# Patient Record
Sex: MALE | Race: WHITE | NOT HISPANIC OR LATINO | Employment: UNEMPLOYED | URBAN - METROPOLITAN AREA
[De-identification: names, ages, dates, MRNs, and addresses within clinical notes are randomized per-mention and may not be internally consistent; named-entity substitution may affect disease eponyms.]

---

## 2019-10-25 ENCOUNTER — OFFICE VISIT CONVERTED (OUTPATIENT)
Dept: CARDIOLOGY | Facility: CLINIC | Age: 44
End: 2019-10-25
Attending: SPECIALIST

## 2020-07-13 ENCOUNTER — HOSPITAL ENCOUNTER (OUTPATIENT)
Dept: GENERAL RADIOLOGY | Facility: HOSPITAL | Age: 45
Discharge: HOME | End: 2020-07-13
Attending: INTERNAL MEDICINE

## 2020-07-31 ENCOUNTER — OFFICE VISIT CONVERTED (OUTPATIENT)
Dept: CARDIOLOGY | Facility: CLINIC | Age: 45
End: 2020-07-31
Attending: SPECIALIST

## 2021-05-04 ENCOUNTER — HOSPITAL ENCOUNTER (OUTPATIENT)
Dept: MRI IMAGING | Facility: HOSPITAL | Age: 46
Discharge: HOME | End: 2021-05-04
Attending: PSYCHIATRY & NEUROLOGY

## 2021-05-13 NOTE — PROGRESS NOTES
"   Progress Note      Patient Name: Abraham Sheridan   Patient ID: [id number]   Sex: Male   YOB: 1975    Primary Care Provider: Trudy Hughes MD   Referring Provider: Trudy Hughes MD    Visit Date: July 31, 2020    Provider: David Fernández MD   Location: [facility]   Location Address: [address] [place] [address], [facility]   [place], [place]  [id number]   Location Phone: [phone]          Chief Complaint     Coronary artery disease.       History Of Present Illness  Abraham Sheridan is a 45 year old /White male with a history of coronary artery disease status post PTCA/stent. No further chest pain. No PND.   CURRENT MEDICATIONS: Aspirin 81 mg daily; atorvastatin 20 mg daily; Plavix 75 mg daily; sertraline 100 mg daily.   PAST MEDICAL HISTORY: COPD; Coronary artery disease status post PTCA/stent; Hyperlipidemia; Hypertension; Obstructive sleep apnea; Polycythemia.   FAMILY HISTORY: Positive for diabetes mellitus, hypertension, and heart disease.   PSYCHOSOCIAL HISTORY: Previously smoked, but quit. Rarely consumes alcohol.       Review of Systems  · Cardiovascular  o Admits  o : palpitations (fast, fluttering, or skipping beats), shortness of breath while walking or lying flat, chest pain or angina pectoris   o Denies  o : swelling (feet, ankles, hands)  · Respiratory  o Admits  o : chronic or frequent cough, asthma or wheezing      Vitals  Date Time BP Position Site L\R Cuff Size HR RR TEMP (F) WT  HT  BMI kg/m2 BSA m2 O2 Sat        07/31/2020 10:01 /89 Sitting    70 - R   167lbs 4oz 5'  3\" 29.63 1.84           Physical Examination  · Constitutional  o Appearance  o : Awake, alert, cooperative, pleasant.  · Respiratory  o Inspection of Chest  o : No chest wall deformities, moving equal.  o Auscultation of Lungs  o : Good air entry with vesicular breath sounds.  · Cardiovascular  o Heart  o :   § Auscultation of Heart  § : S1 and S2 regular. No S3. No S4.   o Peripheral Vascular " System  o :   § Extremities  § : Peripheral pulses were well felt. No edema. No cyanosis.  · Gastrointestinal  o Abdominal Examination  o : No masses or organomegaly noted.          Assessment     ASSESSMENT & PLAN:    1.  Coronary artery disease status post PTCA/stent, stable.  Continue current dose of aspirin and Plavix.         Recent sestamibi stress test was negative for ischemia.    2.  Hyperlipidemia.  Continue Lipitor, managed by PMD.  3.  See me back in 6 months.      David Fernández MD, FACC  JULIANE:vm             Electronically Signed by: Nicolasa Robbins-, Other -Author on August 3, 2020 11:52:18 AM  Electronically Co-signed by: David Fernández MD -Reviewer on August 15, 2020 09:34:16 AM

## 2021-05-15 VITALS
SYSTOLIC BLOOD PRESSURE: 128 MMHG | HEIGHT: 63 IN | DIASTOLIC BLOOD PRESSURE: 89 MMHG | HEART RATE: 70 BPM | BODY MASS INDEX: 29.63 KG/M2 | WEIGHT: 167.25 LBS

## 2021-05-15 VITALS
HEART RATE: 80 BPM | BODY MASS INDEX: 30.48 KG/M2 | WEIGHT: 172 LBS | SYSTOLIC BLOOD PRESSURE: 134 MMHG | DIASTOLIC BLOOD PRESSURE: 90 MMHG | HEIGHT: 63 IN

## 2021-05-26 ENCOUNTER — HOSPITAL ENCOUNTER (OUTPATIENT)
Dept: NEUROLOGY | Facility: HOSPITAL | Age: 46
Discharge: HOME | End: 2021-05-26
Attending: PSYCHIATRY & NEUROLOGY

## 2021-10-12 ENCOUNTER — HOSPITAL ENCOUNTER (OUTPATIENT)
Dept: GENERAL RADIOLOGY | Facility: HOSPITAL | Age: 46
Discharge: HOME | End: 2021-10-12
Admitting: INTERNAL MEDICINE

## 2021-10-12 ENCOUNTER — TRANSCRIBE ORDERS (OUTPATIENT)
Dept: GENERAL RADIOLOGY | Facility: HOSPITAL | Age: 46
End: 2021-10-12

## 2021-10-12 DIAGNOSIS — J44.9 CHRONIC OBSTRUCTIVE PULMONARY DISEASE, UNSPECIFIED COPD TYPE (HCC): Primary | ICD-10-CM

## 2021-10-12 DIAGNOSIS — J44.9 OBSTRUCTIVE CHRONIC BRONCHITIS WITHOUT EXACERBATION (HCC): ICD-10-CM

## 2021-10-12 DIAGNOSIS — J44.9 CHRONIC OBSTRUCTIVE PULMONARY DISEASE, UNSPECIFIED COPD TYPE (HCC): ICD-10-CM

## 2021-10-12 PROCEDURE — 71046 X-RAY EXAM CHEST 2 VIEWS: CPT

## 2021-12-12 PROBLEM — E78.2 HYPERLIPEMIA, MIXED: Status: ACTIVE | Noted: 2021-12-12

## 2021-12-12 PROBLEM — I25.10 CORONARY ARTERY DISEASE INVOLVING NATIVE CORONARY ARTERY OF NATIVE HEART WITHOUT ANGINA PECTORIS: Status: ACTIVE | Noted: 2021-12-12

## 2021-12-12 PROBLEM — Z95.5 HISTORY OF CORONARY ANGIOPLASTY WITH INSERTION OF STENT: Status: ACTIVE | Noted: 2021-12-12

## 2021-12-12 NOTE — PROGRESS NOTES
[facility]  Cardiology progress Note    Patient Name: Abraham Sheridan  : 1975    CHIEF COMPLAINT  Coronary artery disease.      Subjective   Subjective     HISTORY OF PRESENT ILLNESS    Abraham Sheridan is a 46 y.o. male with history of coronary disease s/p PTCA/stent.  No chest pain or shortness of breath.    Review of Systems:   Constitutional no fever,  no weight loss   Skin no rash   Otolaryngeal no difficulty swallowing   Cardiovascular See HPI   Pulmonary no cough, no sputum production   Gastrointestinal no constipation, no diarrhea   Genitourinary no dysuria, no hematuria   Hematologic no easy bruisability, no abnormal bleeding   Musculoskeletal no muscle pain   Neurologic no dizziness, no falls         Personal History     Social History:  reports that he has quit smoking. He has never used smokeless tobacco. He reports previous alcohol use. Drug use questions deferred to the physician.    Home Medications:  Current Outpatient Medications on File Prior to Visit   Medication Sig   • ALBUTEROL IN Inhale.   • aspirin 81 MG EC tablet Take 81 mg by mouth Daily.   • atorvastatin (LIPITOR) 20 MG tablet Take 20 mg by mouth Daily.   • clonazePAM (KlonoPIN) 1 MG tablet Take 1 mg by mouth 2 (Two) Times a Day As Needed.   • clopidogrel (PLAVIX) 75 MG tablet Take 75 mg by mouth Daily.   • donepezil (ARICEPT) 10 MG tablet Take 10 mg by mouth 2 (Two) Times a Day.   • sertraline (ZOLOFT) 100 MG tablet Take 100 mg by mouth Daily.   • topiramate (TOPAMAX) 50 MG tablet Take 50 mg by mouth 2 (Two) Times a Day.     No current facility-administered medications on file prior to visit.     Allergies:  No Known Allergies    Objective    Objective       Vitals:   Heart Rate:  [72] 72  BP: (126)/(88) 126/88  Body mass index is 30.82 kg/m².     Physical Exam:   Constitutional: Awake, alert, No acute distress    Eyes: PERRLA, sclerae anicteric, no conjunctival injection   HENT: NCAT, mucous membranes moist   Neck: Supple, no  thyromegaly, no lymphadenopathy, trachea midline   Respiratory: Clear to auscultation bilaterally, nonlabored respirations    Cardiovascular: RRR, no murmurs or rubs. Palpable pedal pulses bilaterally   Musculoskeletal: No bilateral ankle edema, no cyanosis to extremities   Psychiatric: Appropriate affect, cooperative   Neurologic: Oriented x 3, strength symmetric in all extremities, Cranial Nerves grossly intact to confrontation, speech clear   Skin: No rashes.    Result Review    Result Review:  I have personally reviewed the available results from  [x]  Laboratory  [x]  EKG  [x]  Cardiology  [x]  Medications  [x]  Old records  []  Other:   Procedures  No results found for: CHOL  Lab Results   Component Value Date    TRIG 112 04/29/2020     Lab Results   Component Value Date    HDL 27 (L) 04/29/2020     Lab Results   Component Value Date     (H) 04/29/2020     Lab Results   Component Value Date    VLDL 22 04/29/2020        Impression/Plan:  1.  Coronary artery s/p PTCA stent stable: Continue aspirin 81 mg once a day.  Continue Plavix 75 mg once a day.  2.  Hyperlipidemia: Increase Lipitor 40 mg a day.  Lipid profile reviewed.  Check lipid and hepatic profile in 3 months with  3.  Obstructive sleep apnea stable: Shortness of breath stable.           David Fernández MD   12/14/21   12:03 EST

## 2021-12-14 ENCOUNTER — OFFICE VISIT (OUTPATIENT)
Dept: CARDIOLOGY | Facility: CLINIC | Age: 46
End: 2021-12-14

## 2021-12-14 VITALS
BODY MASS INDEX: 30.83 KG/M2 | HEART RATE: 72 BPM | SYSTOLIC BLOOD PRESSURE: 126 MMHG | HEIGHT: 63 IN | DIASTOLIC BLOOD PRESSURE: 88 MMHG | WEIGHT: 174 LBS

## 2021-12-14 DIAGNOSIS — I25.10 CORONARY ARTERY DISEASE INVOLVING NATIVE CORONARY ARTERY OF NATIVE HEART WITHOUT ANGINA PECTORIS: Primary | ICD-10-CM

## 2021-12-14 DIAGNOSIS — Z95.5 HISTORY OF CORONARY ANGIOPLASTY WITH INSERTION OF STENT: ICD-10-CM

## 2021-12-14 DIAGNOSIS — E78.2 HYPERLIPEMIA, MIXED: ICD-10-CM

## 2021-12-14 PROCEDURE — 99214 OFFICE O/P EST MOD 30 MIN: CPT | Performed by: SPECIALIST

## 2021-12-14 RX ORDER — ATORVASTATIN CALCIUM 20 MG/1
20 TABLET, FILM COATED ORAL DAILY
COMMUNITY
End: 2021-12-14 | Stop reason: SDUPTHER

## 2021-12-14 RX ORDER — SERTRALINE HYDROCHLORIDE 100 MG/1
100 TABLET, FILM COATED ORAL DAILY
COMMUNITY

## 2021-12-14 RX ORDER — ATORVASTATIN CALCIUM 40 MG/1
40 TABLET, FILM COATED ORAL DAILY
Qty: 90 TABLET | Refills: 3 | Status: SHIPPED | OUTPATIENT
Start: 2021-12-14 | End: 2022-07-21

## 2021-12-14 RX ORDER — CLOPIDOGREL BISULFATE 75 MG/1
75 TABLET ORAL DAILY
COMMUNITY

## 2021-12-14 RX ORDER — ASPIRIN 81 MG/1
81 TABLET ORAL DAILY
COMMUNITY

## 2021-12-14 RX ORDER — TOPIRAMATE 50 MG/1
50 TABLET, FILM COATED ORAL 2 TIMES DAILY
COMMUNITY
End: 2022-07-21

## 2021-12-14 RX ORDER — CLONAZEPAM 1 MG/1
1 TABLET ORAL 2 TIMES DAILY PRN
COMMUNITY

## 2021-12-14 RX ORDER — DONEPEZIL HYDROCHLORIDE 10 MG/1
10 TABLET, FILM COATED ORAL 2 TIMES DAILY
COMMUNITY

## 2021-12-22 ENCOUNTER — OFFICE VISIT (OUTPATIENT)
Dept: GASTROENTEROLOGY | Facility: CLINIC | Age: 46
End: 2021-12-22

## 2021-12-22 VITALS
HEART RATE: 91 BPM | DIASTOLIC BLOOD PRESSURE: 93 MMHG | BODY MASS INDEX: 29.65 KG/M2 | WEIGHT: 167.33 LBS | SYSTOLIC BLOOD PRESSURE: 132 MMHG | HEIGHT: 63 IN

## 2021-12-22 DIAGNOSIS — R14.0 ABDOMINAL BLOATING: ICD-10-CM

## 2021-12-22 DIAGNOSIS — R19.7 DIARRHEA, UNSPECIFIED TYPE: Primary | ICD-10-CM

## 2021-12-22 PROCEDURE — 99204 OFFICE O/P NEW MOD 45 MIN: CPT | Performed by: NURSE PRACTITIONER

## 2021-12-22 RX ORDER — OLANZAPINE 15 MG/1
15 TABLET ORAL NIGHTLY
COMMUNITY

## 2021-12-22 RX ORDER — MONTELUKAST SODIUM 4 MG/1
1 TABLET, CHEWABLE ORAL 3 TIMES DAILY PRN
Qty: 90 TABLET | Refills: 3 | Status: SHIPPED | OUTPATIENT
Start: 2021-12-22 | End: 2022-07-21

## 2021-12-22 NOTE — PROGRESS NOTES
"Patient Name: Abraham Sheridan   Visit Date: 12/22/2021   Patient ID: [id number]  Provider: REINA Worley    Sex: male  Location:  Location Address:  Location Phone: [address]  [place] [address] [phone]    YOB: 1975      Primary Care Provider Trudy Hughes MD      Referring Provider: Trudy Hughes MD        Chief Complaint  Diarrhea (Can go up to 10 times daily), Abdominal Pain (All over, Extremely Bloated, Gas ), Nausea (At times ), and Heartburn    History of Present Illness  Abraham Sheridan is a 46 y.o. who presents to [facility] GASTR[facility]OLOG[facility] on referral from Trudy Hughes MD for a gastroenterology evaluation of Diarrhea (Can go up to 10 times daily), Abdominal Pain (All over, Extremely Bloated, Gas ), Nausea (At times ), and Heartburn.    Mr. Sheridan reports diarrhea for many years now. Bowel movements 5+ times a day. Stool is always loose and he describes it to look like \"mushed up ground beef\". Fecal urgency after all meals no matter the type of food he eats.  History of cholecystectomy 13 years ago. Constant abdominal bloating with discomofort.  Previously has seen blood in the toilet however none recently. Previous colonoscopy in 2016 with hemorrhoid removal. Requesting records. Denies melena. Weight stable.     Labs Result Review Imaging    Past Medical History:   Diagnosis Date   • COPD (chronic obstructive pulmonary disease) (HCC)    • Coronary artery disease    • Hyperlipidemia    • Hypertension    • Myocardial infarction (HCC)    • Sleep apnea        Past Surgical History:   Procedure Laterality Date   • CARDIAC CATHETERIZATION     • COLONOSCOPY  [id number]    [place]    • CORONARY STENT PLACEMENT           Current Outpatient Medications:   •  ALBUTEROL IN, Inhale., Disp: , Rfl:   •  aspirin 81 MG EC tablet, Take 81 mg by mouth Daily., Disp: , Rfl:   •  atorvastatin (LIPITOR) 40 MG tablet, Take 1 tablet by mouth Daily., Disp: 90 tablet, " "Rfl: 3  •  clonazePAM (KlonoPIN) 1 MG tablet, Take 1 mg by mouth 2 (Two) Times a Day As Needed., Disp: , Rfl:   •  clopidogrel (PLAVIX) 75 MG tablet, Take 75 mg by mouth Daily., Disp: , Rfl:   •  donepezil (ARICEPT) 10 MG tablet, Take 10 mg by mouth 2 (Two) Times a Day., Disp: , Rfl:   •  OLANZapine (zyPREXA) 15 MG tablet, Take 15 mg by mouth Every Night., Disp: , Rfl:   •  sertraline (ZOLOFT) 100 MG tablet, Take 100 mg by mouth Daily., Disp: , Rfl:   •  topiramate (TOPAMAX) 50 MG tablet, Take 50 mg by mouth 2 (Two) Times a Day., Disp: , Rfl:   •  colestipol (Colestid) 1 g tablet, Take 1 tablet by mouth 3 (Three) Times a Day As Needed (For diarrhea) for up to 90 doses., Disp: 90 tablet, Rfl: 3     No Known Allergies    Family History   Problem Relation Age of Onset   • Colon cancer Maternal Grandmother    • Stomach cancer Paternal Grandmother    • Pancreatic cancer Paternal Grandmother         Social History     Social History Narrative   • Not on file         Objective     Review of Systems   Constitutional: Positive for appetite change.   Gastrointestinal: Positive for diarrhea.        Vital Signs:   /93 (BP Location: Left arm, Patient Position: Sitting, Cuff Size: Large Adult)   Pulse 91   Ht 160 cm (63\")   Wt 75.9 kg (167 lb 5.3 oz)   BMI 29.64 kg/m²       Physical Exam  Constitutional:       General: He is not in acute distress.     Appearance: Normal appearance. He is well-developed and normal weight.   HENT:      Head: Normocephalic and atraumatic.   Eyes:      Conjunctiva/sclera: Conjunctivae normal.      Pupils: Pupils are equal, round, and reactive to light.      Visual Fields: Right eye visual fields normal and left eye visual fields normal.   Cardiovascular:      Rate and Rhythm: Normal rate and regular rhythm.      Heart sounds: Normal heart sounds.   Pulmonary:      Effort: Pulmonary effort is normal. No retractions.      Breath sounds: Normal breath sounds and air entry.   Abdominal:      " General: Bowel sounds are normal.      Palpations: Abdomen is soft.      Tenderness: There is no abdominal tenderness.      Comments: No appreciable hepatosplenomegaly or ascites   Musculoskeletal:         General: Normal range of motion.      Cervical back: Neck supple.      Right lower leg: No edema.      Left lower leg: No edema.   Lymphadenopathy:      Cervical: No cervical adenopathy.   Skin:     General: Skin is warm and dry.      Findings: No lesion.   Neurological:      General: No focal deficit present.      Mental Status: He is alert and oriented to person, place, and time.   Psychiatric:         Mood and Affect: Mood and affect normal.         Behavior: Behavior normal.         Result Review :   The following data was reviewed by: REINA Worley on 12/22/2021:      No results found for: IRON, TIBC, FERRITIN, LABIRON           Assessment and Plan    Diagnoses and all orders for this visit:    1. Diarrhea, unspecified type (Primary)  -     Calprotectin, Fecal - Stool, Per Rectum; Future  -     Clostridium Difficile Toxin - Stool, Per Rectum; Future  -     Enteric Bacterial Panel - Stool, Per Rectum; Future  -     Enteric Parasite Panel - Stool, Per Rectum; Future  -     Pancreatic Elastase, Fecal - Stool, Per Rectum; Future  -     Fecal Lactoferrin - Stool, Per Rectum; Future    2. Abdominal bloating  -     US Abdomen Complete; Future    Other orders  -     colestipol (Colestid) 1 g tablet; Take 1 tablet by mouth 3 (Three) Times a Day As Needed (For diarrhea) for up to 90 doses.  Dispense: 90 tablet; Refill: 3      * Surgery not found *       Follow Up   Return in about 3 months (around 3/22/2022).  Patient was given instructions and counseling regarding his condition or for health maintenance advice. Please see specific information pulled into the AVS if appropriate.

## 2021-12-29 ENCOUNTER — PATIENT ROUNDING (BHMG ONLY) (OUTPATIENT)
Dept: GASTROENTEROLOGY | Facility: CLINIC | Age: 46
End: 2021-12-29

## 2021-12-29 NOTE — PROGRESS NOTES
December 29, 2021    Hello, may I speak with Abraham Sheridan?    My name is Wendy AGGARWAL      I am  with [facility]  [address] [address] [phone].    Before we get started may I verify your date of birth? 1975    I am calling to officially welcome you to our practice and ask about your recent visit. Is this a good time to talk? Yes     Tell me about your visit with us. What things went well?  It was a great visit      We're always looking for ways to make our patients' experiences even better. Do you have recommendations on ways we may improve?  No it was a good visit     Overall were you satisfied with your first visit to our practice? Yes        I appreciate you taking the time to speak with me today. Is there anything else I can do for you? Had questions about meds [facility]      Thank you, and have a great day.

## 2022-01-04 ENCOUNTER — LAB (OUTPATIENT)
Dept: LAB | Facility: HOSPITAL | Age: 47
End: 2022-01-04

## 2022-01-04 ENCOUNTER — HOSPITAL ENCOUNTER (OUTPATIENT)
Dept: ULTRASOUND IMAGING | Facility: HOSPITAL | Age: 47
Discharge: HOME | End: 2022-01-04

## 2022-01-04 DIAGNOSIS — R19.7 DIARRHEA, UNSPECIFIED TYPE: ICD-10-CM

## 2022-01-04 DIAGNOSIS — R14.0 ABDOMINAL BLOATING: ICD-10-CM

## 2022-01-04 LAB
027 TOXIN: NORMAL
C DIFF TOX GENS STL QL NAA+PROBE: NEGATIVE
LACTOFERRIN STL QL LA: NEGATIVE

## 2022-01-04 PROCEDURE — 87506 IADNA-DNA/RNA PROBE TQ 6-11: CPT

## 2022-01-04 PROCEDURE — 76700 US EXAM ABDOM COMPLETE: CPT

## 2022-01-04 PROCEDURE — 82653 EL-1 FECAL QUANTITATIVE: CPT

## 2022-01-04 PROCEDURE — 87493 C DIFF AMPLIFIED PROBE: CPT

## 2022-01-04 PROCEDURE — 83993 ASSAY FOR CALPROTECTIN FECAL: CPT

## 2022-01-04 PROCEDURE — 83630 LACTOFERRIN FECAL (QUAL): CPT

## 2022-01-05 LAB
C COLI+JEJ+UPSA DNA STL QL NAA+NON-PROBE: NOT DETECTED
CRYPTOSP DNA STL QL NAA+NON-PROBE: NOT DETECTED
E HISTOLYT DNA STL QL NAA+NON-PROBE: NOT DETECTED
EC STX1+STX2 GENES STL QL NAA+NON-PROBE: NOT DETECTED
G LAMBLIA DNA STL QL NAA+NON-PROBE: NOT DETECTED
S ENT+BONG DNA STL QL NAA+NON-PROBE: NOT DETECTED
SHIGELLA SP+EIEC IPAH ST NAA+NON-PROBE: NOT DETECTED

## 2022-01-06 LAB
CALPROTECTIN STL-MCNT: 26 UG/G (ref 0–120)
ELASTASE PANC STL-MCNT: >500 UG ELAST./G

## 2022-07-21 ENCOUNTER — OFFICE VISIT (OUTPATIENT)
Dept: GASTROENTEROLOGY | Facility: CLINIC | Age: 47
End: 2022-07-21

## 2022-07-21 VITALS
SYSTOLIC BLOOD PRESSURE: 124 MMHG | HEIGHT: 63 IN | WEIGHT: 161.2 LBS | HEART RATE: 67 BPM | BODY MASS INDEX: 28.56 KG/M2 | DIASTOLIC BLOOD PRESSURE: 91 MMHG

## 2022-07-21 DIAGNOSIS — R14.0 ABDOMINAL BLOATING: ICD-10-CM

## 2022-07-21 DIAGNOSIS — R19.7 DIARRHEA, UNSPECIFIED TYPE: Primary | ICD-10-CM

## 2022-07-21 DIAGNOSIS — K58.0 IRRITABLE BOWEL SYNDROME WITH DIARRHEA: ICD-10-CM

## 2022-07-21 PROCEDURE — 99213 OFFICE O/P EST LOW 20 MIN: CPT | Performed by: NURSE PRACTITIONER

## 2022-07-21 RX ORDER — BUSPIRONE HYDROCHLORIDE 10 MG/1
TABLET ORAL
COMMUNITY
Start: 2022-06-24

## 2022-07-21 RX ORDER — ATORVASTATIN CALCIUM 80 MG/1
80 TABLET, FILM COATED ORAL DAILY
COMMUNITY

## 2022-07-21 RX ORDER — ATORVASTATIN CALCIUM 20 MG/1
20 TABLET, FILM COATED ORAL EVERY EVENING
COMMUNITY
Start: 2022-05-26 | End: 2022-07-21

## 2022-07-21 RX ORDER — DICYCLOMINE HYDROCHLORIDE 10 MG/1
10 CAPSULE ORAL 4 TIMES DAILY PRN
Qty: 120 CAPSULE | Refills: 3 | Status: SHIPPED | OUTPATIENT
Start: 2022-07-21

## 2022-07-21 NOTE — PROGRESS NOTES
Chief Complaint  Follow-up (Diarrhea )    History of Present Illness  Abraham Sheridan is a 47 y.o. who presents to [facility]STRO[facility] for follow up of Follow-up (Diarrhea )     Mr. Sheridan presents today of f/u diarrhea.  Reports he was unable to tolerate the colestipol as it caused constipation.  Has been using 1 tablet of colestipol however if traveling or going to be away from a restaurant for a long period [place]  Reports he would not have a bowel movement for at least 2 days after taking 1 tablet.  When he takes nothing he has a bowel movement 5+ times a day, loose stool.  Abdominal cramping and bloating present.  Has noticed a slight decrease in symptoms after starting BuSpar 3 weeks ago.  Denies any hematochezia or melena.      Labs Result Review Imaging    Past Medical History:   Diagnosis Date   • COPD (chronic obstructive pulmonary disease) (HCC)    • Coronary artery disease    • Hyperlipidemia    • Hypertension    • Myocardial infarction (HCC)    • Sleep apnea        Past Surgical History:   Procedure Laterality Date   • CARDIAC CATHETERIZATION     • COLONOSCOPY  [id number]    [place]    • CORONARY STENT PLACEMENT         Current Outpatient Medications on File Prior to Visit   Medication Sig Dispense Refill   • ALBUTEROL IN Inhale.     • aspirin 81 MG EC tablet Take 81 mg by mouth Daily.     • atorvastatin (LIPITOR) 80 MG tablet Take 80 mg by mouth Daily.     • busPIRone (BUSPAR) 10 MG tablet      • clonazePAM (KlonoPIN) 1 MG tablet Take 1 mg by mouth 2 (Two) Times a Day As Needed.     • clopidogrel (PLAVIX) 75 MG tablet Take 75 mg by mouth Daily.     • OLANZapine (zyPREXA) 15 MG tablet Take 15 mg by mouth Every Night.     • ProAir  (90 Base) MCG/ACT inhaler Inhale 2 puffs Every 4 (Four) Hours As Needed.     • sertraline (ZOLOFT) 100 MG tablet Take 100 mg by mouth Daily.     • donepezil (ARICEPT) 10 MG tablet Take 10 mg by mouth 2 (Two) Times a  "Day.     • [DISCONTINUED] atorvastatin (LIPITOR) 20 MG tablet Take 20 mg by mouth Every Evening.     • [DISCONTINUED] atorvastatin (LIPITOR) 40 MG tablet Take 1 tablet by mouth Daily. 90 tablet 3   • [DISCONTINUED] colestipol (Colestid) 1 g tablet Take 1 tablet by mouth 3 (Three) Times a Day As Needed (For diarrhea) for up to 90 doses. 90 tablet 3   • [DISCONTINUED] topiramate (TOPAMAX) 50 MG tablet Take 50 mg by mouth 2 (Two) Times a Day.       No current facility-administered medications on file prior to visit.       Social History     Social History Narrative   • Not on file         Objective     Review of Systems   Gastrointestinal: Positive for abdominal distention and diarrhea.        Vital Signs:   /91 (BP Location: Right arm, Patient Position: Sitting, Cuff Size: Small Adult)   Pulse 67   Ht 160 cm (63\")   Wt 73.1 kg (161 lb 3.2 oz)   BMI 28.56 kg/m²       Physical Exam  Constitutional:       General: He is not in acute distress.     Appearance: Normal appearance. He is well-developed and normal weight.   HENT:      Head: Normocephalic and atraumatic.   Eyes:      Conjunctiva/sclera: Conjunctivae normal.      Pupils: Pupils are equal, round, and reactive to light.      Visual Fields: Right eye visual fields normal and left eye visual fields normal.   Cardiovascular:      Rate and Rhythm: Normal rate and regular rhythm.      Heart sounds: Normal heart sounds.   Pulmonary:      Effort: Pulmonary effort is normal. No retractions.      Breath sounds: Normal breath sounds and air entry.   Abdominal:      General: Bowel sounds are normal. There is no distension.      Palpations: Abdomen is soft.      Tenderness: There is no abdominal tenderness.      Comments: No appreciable hepatosplenomegaly or ascites   Musculoskeletal:         General: Normal range of motion.      Cervical back: Normal range of motion and neck supple.   Skin:     General: Skin is warm and dry.   Neurological:      Mental Status: He " is alert and oriented to person, place, and time.   Psychiatric:         Mood and Affect: Mood and affect normal.         Behavior: Behavior normal.         Result Review :   The following data was reviewed by: REINA Worley on 07/21/2022:      Hep A IgM   Date Value Ref Range Status   04/29/2020 Negative Negative NA Final     Hep B Core IgM   Date Value Ref Range Status   04/29/2020 Negative Negative NA Final     Protime   Date Value Ref Range Status   04/27/2020 11.1 9.4 - 12.0 s Final     INR   Date Value Ref Range Status   04/27/2020 1.04 (L) 2.00 - 3.00 NA Final     Comment:     Suggested Therapeutic Ranges For Oral Anticoagulant Therapy:  Level of Therapy                      INR Target Range  Standard Dose                            2.0-3.0  High Dose                                2.5-3.5  Patients not receiving anticoagulant  Therapy Normal Range                     0.6-1.2               Assessment and Plan    Diagnoses and all orders for this visit:    1. Diarrhea, unspecified type (Primary)    2. Abdominal bloating    3. Irritable bowel syndrome with diarrhea    Other orders  -     dicyclomine (BENTYL) 10 MG capsule; Take 1 capsule by mouth 4 (Four) Times a Day As Needed (For abdominal pain and/or diarrhea).  Dispense: 120 capsule; Refill: 3  -     riFAXIMin (Xifaxan) 550 MG tablet; Take 1 tablet by mouth 3 (Three) Times a Day for 14 days.  Dispense: 42 tablet; Refill: 2      * Surgery not found *       Follow Up   Return in about 4 months (around 11/21/2022).  Patient was given instructions and counseling regarding his condition or for health maintenance advice. Please see specific information pulled into the AVS if appropriate.

## 2022-11-03 ENCOUNTER — TELEPHONE (OUTPATIENT)
Dept: GASTROENTEROLOGY | Facility: CLINIC | Age: 47
End: 2022-11-03

## 2022-11-03 NOTE — TELEPHONE ENCOUNTER
Called patient and left a message attempting to offer the patient an earlier appointment with Elena Benitez as she will no longer be seeing patients on original appointment date.  Number was invalid

## 2022-11-11 NOTE — TELEPHONE ENCOUNTER
Patient hasn't contacted the office back, a letter has been mailed to inform the patient that we are needing to reschedule their appointment.